# Patient Record
Sex: FEMALE | Race: BLACK OR AFRICAN AMERICAN | NOT HISPANIC OR LATINO | Employment: UNEMPLOYED | URBAN - METROPOLITAN AREA
[De-identification: names, ages, dates, MRNs, and addresses within clinical notes are randomized per-mention and may not be internally consistent; named-entity substitution may affect disease eponyms.]

---

## 2018-06-28 ENCOUNTER — TELEPHONE (OUTPATIENT)
Dept: OBSTETRICS AND GYNECOLOGY | Facility: CLINIC | Age: 23
End: 2018-06-28

## 2018-06-28 ENCOUNTER — NURSE TRIAGE (OUTPATIENT)
Dept: ADMINISTRATIVE | Facility: CLINIC | Age: 23
End: 2018-06-28

## 2018-06-28 NOTE — TELEPHONE ENCOUNTER
Spoke with pt. Pt states she is 5 days late for her cycle. States she has not had a positive home pregnancy test. States she will call back to reschedule.

## 2018-06-28 NOTE — TELEPHONE ENCOUNTER
----- Message from Gertrude Jeffries sent at 6/27/2018  6:14 PM CDT -----  Contact: Pt  Pt is calling to schedule an initial OB appt and can be reached at [phone]  Last menstrual was on 5/23/18    Thank you

## 2018-06-29 ENCOUNTER — NURSE TRIAGE (OUTPATIENT)
Dept: ADMINISTRATIVE | Facility: CLINIC | Age: 23
End: 2018-06-29

## 2018-06-30 NOTE — TELEPHONE ENCOUNTER
"  Reason for Disposition   Blood in urine (red, pink, or tea-colored)    Answer Assessment - Initial Assessment Questions  1. LOCATION: "Where does it hurt?"      5 wks preg, now having vag bleeding, abd pain.   2. RADIATION: "Does the pain shoot anywhere else?" (e.g., chest, back, shoulder)     No   3. ONSET: "When did the pain begin?" (e.g., minutes, hours or days ago)      Low abd pain this afternoon   4. ONSET: "Gradual or sudden onset?"     Sudden   5. PATTERN "Does the pain come and go, or has it been constant since it started?"      Comes and going- ast couple minutes   6. SEVERITY: "How bad is the pain?" "What does it keep you from doing?"  (e.g., Scale 1-10; mild, moderate, or severe)    - MILD (1-3): doesn't interfere with normal activities, abdomen soft and not tender to touch     - MODERATE (4-7): interferes with normal activities or awakens from sleep, tender to touch     - SEVERE (8-10): excruciating pain, doubled over, unable to do any normal activities     Now 4   7. RECURRENT SYMPTOM: "Have you ever had this type of abdominal pain before?" If so, ask: "When was the last time?" and "What happened that time?"     First preg   8. CAUSE: "What do you think is causing the abdominal pain?"     Unsure ate junk food today. + nausea, no V/D. Drinking fluids ok.  9. RELIEVING/AGGRAVATING FACTORS: "What makes it better or worse?" (e.g., antacids, bowel movement, movement)    Passing blood pain now resolved almost   10. OTHER SYMPTOMS: "Has there been any vaginal bleeding, fever, vomiting, diarrhea, or urine problems?"     T99 , no pain with urinating   11. SOULEYMANE: "What date are you expecting to deliver?"      2/27/2019    Protocols used: ST PREGNANCY - ABDOMINAL PAIN LESS THAN 20 WEEKS EGA-[facility] ED due to bleeding - unclear if urinary or vag, pain. Fever. No OB on file. Call back with questions.   "

## 2018-06-30 NOTE — TELEPHONE ENCOUNTER
Pt name is [facility]     Reason for Disposition   Caller has cancelled the call before the first contact.    Protocols used: ST NO CONTACT OR DUPLICATE CONTACT CALL-A-[facility]